# Patient Record
Sex: MALE | URBAN - METROPOLITAN AREA
[De-identification: names, ages, dates, MRNs, and addresses within clinical notes are randomized per-mention and may not be internally consistent; named-entity substitution may affect disease eponyms.]

---

## 2022-07-25 ENCOUNTER — OFFICE VISIT (OUTPATIENT)
Dept: SURGERY | Age: 37
End: 2022-07-25
Payer: COMMERCIAL

## 2022-07-25 VITALS
BODY MASS INDEX: 23.7 KG/M2 | HEIGHT: 69 IN | WEIGHT: 160 LBS | DIASTOLIC BLOOD PRESSURE: 83 MMHG | HEART RATE: 97 BPM | OXYGEN SATURATION: 98 % | TEMPERATURE: 97.4 F | SYSTOLIC BLOOD PRESSURE: 117 MMHG

## 2022-07-25 DIAGNOSIS — R22.2 ABDOMINAL WALL MASS: Primary | ICD-10-CM

## 2022-07-25 PROCEDURE — 99204 OFFICE O/P NEW MOD 45 MIN: CPT | Performed by: SURGERY

## 2022-07-25 NOTE — PROGRESS NOTES
Mariposa Haddad is a 42 y.o. male (: 1985) presenting to address:    Chief Complaint   Patient presents with    New Patient     Abd wall mass on right side, x 3 months/ referred by Dr Marli Butts with [facility]       Medication list and allergies have been reviewed with Mariposa Haddad and updated as of today's date. I have gone over all Medical, Surgical and Social History with Mariposa Haddad and updated/added the information accordingly.

## 2022-07-25 NOTE — PROGRESS NOTES
General Surgery Consult    Cynthia Tnaner  Admit date: (Not on file)    MRN: [id number]     : 1985     Age: 42 y.o. Attending Physician: Jeremy Em MD, [facility]      History of Present Illness:      Cynthia Tanner is a 42 y.o. male was referred to me by Dr. Derik Hurley for evaluation of a right-sided flank mass as well as abdominal pain. The patient has a history of laparotomy at the age of [age]years old according to him and he stated that he had a twisted bowel for which he had some type of a bowel resection. According to him for about 3 months now has been having right-sided abdominal pain and he stated that he injured himself at work and he believes this is the reason for the pain on the right side. He will also has been noticing a bulge and a mass in the right flank area. He went to the emergency room where he ordered a CT scan of abdomen and pelvis and it showed some inflammatory changes in the bowel but I believe at that point he did not know about his previous laparotomy. I did not show the mass itself. There are no problems to display for this patient. History reviewed. No pertinent past medical history. History reviewed. No pertinent surgical history. Social History     Tobacco Use    Smoking status: Every Day     Types: Cigarettes    Smokeless tobacco: Never    Tobacco comments:     Smoke 2 cigarettes a day   Substance Use Topics    Alcohol use: Yes     Comment: soc      Social History     Tobacco Use   Smoking Status Every Day    Types: Cigarettes   Smokeless Tobacco Never   Tobacco Comments    Smoke 2 cigarettes a day     History reviewed. No pertinent family history. No current outpatient medications on file. No current facility-administered medications for this visit. Allergies   Allergen Reactions    Penicillin Swelling        Review of Systems:  Pertinent items are noted in the History of Present Illness.     Objective:     Visit Vitals  /83 (BP 1 Location: Left lower arm, BP Patient Position: Sitting, BP Cuff Size: Small adult)   Pulse 97   Temp 97.4 °F (36.3 °C) (Temporal)   Ht 5' 9\" (1.753 m)   Wt 72.6 kg (160 lb)   SpO2 98%   BMI 23.63 kg/m²       Physical Exam:      General:  in no apparent distress, alert, and oriented times 3   Eyes:  conjunctivae and sclerae normal, pupils equal, round, reactive to light   Throat & Neck: no erythema or exudates noted and neck supple and symmetrical; no palpable masses   Lungs:   clear to auscultation bilaterally   Heart:  Regular rate and rhythm   Abdomen:   flat, soft, nontender, nondistended, no organomegaly. There is a clear soft tissue mass located in the right flank area that is around 3 cm long by 1 cm wide. It is soft and nontender and easily movable consistent with a lipoma. No abdominal wall hernias. Extremities: extremities normal, atraumatic, no cyanosis or edema   Skin: Normal.       Imaging and Lab Review:     CBC: No results found for: WBC, RBC, HGB, HCT, PLT, HGBEXT, HCTEXT, PLTEXT  BMP: No results found for: GLU, NA, K, CL, CO2, BUN, CREA, CA  CMP:No results found for: GLU, NA, K, CL, CO2, BUN, CREA, CA, AGAP, BUCR, TBIL, AP, TP, ALB, GLOB, AGRAT    No results found for this or any previous visit (from the past 24 hour(s)). images and reports reviewed    Assessment:   Trinity Freitas is a 42 y.o. male who is presenting with a soft tissue mass located on the right flank that based on the examination and the history most likely represent a lipoma. I told the patient that I have no doubt that he has a soft tissue mass and it seems to be benign and though the CT scan did not show it the examination is very clear. I explained to him that most likely CT scan did not show it because it is above the fascia and probably it was read as normal fat.   I explained to the patient that there is no evidence of hernia on examination or the CT scan and I told him that there is some abdominal wall vein enlargement which is slightly concerning to me but there is no evidence of liver cirrhosis. The patient stated that he drinks alcohol but not every day. For now I will hold on anything for his right flank mass and I will follow-up with him in 3 months for further evaluation and management and most likely repeat the CT scan to make sure that the findings seen on the CT scan from last month are secondary to his previous laparotomy.     Plan:   Observation   Abstain from alcohol  Follow-up with me in 3 months    Please call me if you have any questions (cell phone: [phone])     Signed By: Samson Rice MD     July 25, 2022